# Patient Record
(demographics unavailable — no encounter records)

---

## 2024-12-20 NOTE — REASON FOR VISIT
[FreeTextEntry1] : Abdominal pain irregular bowel movements family hx of colon cancer. History of H. pylori

## 2024-12-20 NOTE — ASSESSMENT
[FreeTextEntry1] : He most likely has IBS  AKHIL SHAH was advised to undergo colonoscopy to which he agreed. The procedure will be performed in [facility] with the assistance of an anesthesiologist. The patient was given a Clenpiq preparation prescription and understood the  procedure as it was explained to his. He was given a booklet distributed by the [employer]  [employer] explaining the procedure in detail and he understood the risks of the procedure not limited to infection, bleeding, perforation or non- diagnosis of colorectal cancer. He was advised that he could not drive home, if he chooses to  receive sedation.  Further diagnostic and treatment recommendations will be based upon the procedure and any biopsies, if they are taken.  Thank you for allowing me to participate in this [facility] health care.  , Best personal regards -- Don I A Breath test was administered by Sejal   I spent 45 minutes with the patient and answered all of his questions.

## 2024-12-20 NOTE — HISTORY OF PRESENT ILLNESS
[FreeTextEntry1] : He is a 30-year-old male with recent onset of lower abdominal discomfort associated with irregular bowel habits consisting of constipation alternating with diarrhea.  He denies rectal bleeding or weight loss.  He admits to increased stress  in his life due to his job.  He has a family history of colon cancer specifically his grandfather and his uncle.  He also has a past history of Helicobacter pylori  and was treated in January of this year, but never had a follow-up Breath test to confirm eradication of his bacteria.

## 2024-12-20 NOTE — ASSESSMENT
[FreeTextEntry1] : He most likely has IBS  AKHIL SHAH was advised to undergo colonoscopy to which he agreed. The procedure will be performed in [facility] with the assistance of an anesthesiologist. The patient was given a Clenpiq preparation prescription and understood the  procedure as it was explained to his. He was given a booklet distributed by the [employer] G[employer]  Endoscopy explaining the procedure in detail and he understood the risks of the procedure not limited to infection, bleeding, perforation or non- diagnosis of colorectal cancer. He was advised that he could not drive home, if he chooses to  receive sedation.  Further diagnostic and treatment recommendations will be based upon the procedure and any biopsies, if they are taken.  Thank you for allowing me to participate in this [facility] health care.  , Best personal regards -- Don I A Breath test was administered by Sejal   I spent 45 minutes with the patient and answered all of his questions.